# Patient Record
Sex: FEMALE | Race: WHITE | Employment: FULL TIME | URBAN - METROPOLITAN AREA
[De-identification: names, ages, dates, MRNs, and addresses within clinical notes are randomized per-mention and may not be internally consistent; named-entity substitution may affect disease eponyms.]

---
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
## 2024-10-08 NOTE — ED NOTES
Bed: 36  Expected date:   Expected time:   Means of arrival:   Comments:  trauma     Natalya Corado RN  05/26/20 4441
CERVICAL COLLAR PLACED.      Livia Huertas RN  05/26/20 2480
ECCHYMOSIS AND ABRASION TO LEFT ANKLE.      Juanita Gilman RN  05/26/20 1209
NON REBREATHER REMOVED PER INSTRUCTION OF PHYSICIAN       Jan Floyd, RN  05/26/20 0358
PATIENT LOG ROLLED, DENIES SPINAL TENDERNESS, NO STEP OFFS OR DEFORMITIES.       Livan Campa, GODFREY  05/26/20 0563
PER RESIDENT NO CT AT THIS TIME.      Elly Garcias RN  05/26/20 5303
PERIPHERAL LABS BEING OBTAINED     Nirav Love RN  05/26/20 0363
SWELLING TO LEFT ANKLE.      Mónica Crook RN  05/26/20 7558
Trauma Alert called at: [phone]  05/26/20 1525
XR BEING OBTAINED.       Checo Gotti RN  05/26/20 3450
No change